# Patient Record
Sex: MALE | URBAN - METROPOLITAN AREA
[De-identification: names, ages, dates, MRNs, and addresses within clinical notes are randomized per-mention and may not be internally consistent; named-entity substitution may affect disease eponyms.]

---

## 2018-11-30 ENCOUNTER — EMERGENCY (EMERGENCY)
Facility: HOSPITAL | Age: 28
LOS: 1 days | Discharge: HOME | End: 2018-11-30
Attending: EMERGENCY MEDICINE
Payer: COMMERCIAL

## 2018-11-30 VITALS — RESPIRATION RATE: 18 BRPM | HEART RATE: 96 BPM | OXYGEN SATURATION: 100 % | TEMPERATURE: 99 F

## 2018-11-30 VITALS
OXYGEN SATURATION: 100 % | DIASTOLIC BLOOD PRESSURE: 65 MMHG | SYSTOLIC BLOOD PRESSURE: 132 MMHG | TEMPERATURE: 98 F | HEART RATE: 81 BPM | RESPIRATION RATE: 16 BRPM

## 2018-11-30 PROCEDURE — 99283 EMERGENCY DEPT VISIT LOW MDM: CPT | Mod: 25

## 2018-11-30 PROCEDURE — 71046 X-RAY EXAM CHEST 2 VIEWS: CPT

## 2018-11-30 PROCEDURE — 71045 X-RAY EXAM CHEST 1 VIEW: CPT

## 2018-11-30 PROCEDURE — 71045 X-RAY EXAM CHEST 1 VIEW: CPT | Mod: 26,76

## 2018-11-30 PROCEDURE — 93010 ELECTROCARDIOGRAM REPORT: CPT

## 2018-11-30 PROCEDURE — 99284 EMERGENCY DEPT VISIT MOD MDM: CPT

## 2018-11-30 PROCEDURE — 93005 ELECTROCARDIOGRAM TRACING: CPT

## 2018-11-30 NOTE — ED ADULT NURSE NOTE - OBJECTIVE STATEMENT
pt reports sob, non productive cough, fatigue, minor chest discomfort. reports symptoms happened suddenly earlier today at work. reports hx of spontaneous, pneumothoraxes multiple times. a and o x3. breathing even and unlabored. lungs cta. pt has no other complaints at this time. will continue to monitor.

## 2018-11-30 NOTE — ED PROVIDER NOTE - OBJECTIVE STATEMENT
28 YO MALE WITH C/O SOB. HX 6 PNEUMOTHORAX IN PAST. + CHEST PAIN. TODAY MILD SOB, DID NOT "FEEL WELL", MILD FEVER AND ACHEY ALL OVER.   MED HX ABOVE.  SOCIAL: NO CIGARETTES

## 2018-11-30 NOTE — ED ADULT TRIAGE NOTE - CHIEF COMPLAINT QUOTE
patient c/o shortness of breath 2 hours ago with chest pain radiating to the right, patient with 6 histories of Pneumothorax. patient was given 162 aspirin, en route. patient had diminished lung sounds on the left.

## 2018-11-30 NOTE — ED PROVIDER NOTE - CONSTITUTIONAL, MLM
normal... Well appearing, well nourished, awake, alert, oriented to person, place, time/situation and in no apparent distress. NORMAL PULSE RATE AND POX

## 2019-05-31 ENCOUNTER — TRANSCRIPTION ENCOUNTER (OUTPATIENT)
Age: 29
End: 2019-05-31

## 2024-10-02 NOTE — ED ADULT NURSE NOTE - EENT WDL
HPI:  12/20/21, Time: 10:24 PM JAYLENE Carty is a 36 y.o. male presenting to the ED for psychiatric evaluation, beginning days ago. The complaint has been persistent, moderate in severity, and worsened by nothing. Patient reports feeling paranoid. Patient feels that people are talking about him. Patient reports he is taking his injection for his psychiatric disease. Patient reporting no homicidal suicidal thoughts. Patient reporting auditory hallucinations. Patient reporting no chest pain no difficulty breathing no abdominal pain or vomiting there is no diarrhea. Patient admits that he is homeless. ROS:   Pertinent positives and negatives are stated within HPI, all other systems reviewed and are negative.  --------------------------------------------- PAST HISTORY ---------------------------------------------  Past Medical History:  has a past medical history of Depression and Schizoaffective disorder ([facility] [address] Past Surgical History:  has a past surgical history that includes Hip fracture surgery (Left, 9/12/2021); eye surgery (19 years ago); and Hip fracture surgery (Left, 9/28/2021). Social History:  reports that he has been smoking cigars and cigarettes. He has a 24.00 pack-year smoking history. He has never used smokeless tobacco. He reports current alcohol use of about 2.0 standard drinks of alcohol per week. He reports current drug use. Frequency: 7.00 times per week. Drugs: Cocaine and Marijuana ([employer]). Family History: family history includes Mental Illness in his mother; No Known Problems in his father; Substance Abuse in his mother. The patients home medications have been reviewed.     Allergies: Chlorpheniramine-phenylephrine, Penicillins, and Rondec-d [chlophedianol-pseudoephedrine]    ---------------------------------------------------PHYSICAL EXAM--------------------------------------    Constitutional/General: Alert and oriented x3, well appearing, non toxic in are out to get him. Patient reporting no suicidal homicidal thoughts. Patient reporting no physical complaints of chest pain shortness of breath or abdominal pain. Patient has been seen here before for the same complaint. I did review his labs from prior evaluation. Patient vital signs stable. Patient ate here in the emergency department. Patient wants to go home. Patient reporting no homicidal suicidal thoughts. Gait steady and normal he is oriented x3. Re-Evaluations:             Re-evaluation. Patients symptoms show no change    I did reevaluate patient a little after 12 AM.  Patient no distress wants to go home. Patient ate here in the emergency department and wants to leave now. Patient not homicidal or suicidal  Consultations:                 Critical Care: This patient's ED course included: a personal history and physicial eaxmination    This patient has been closely monitored during their ED course. Counseling: The emergency provider has spoken with the patient and discussed todays results, in addition to providing specific details for the plan of care and counseling regarding the diagnosis and prognosis. Questions are answered at this time and they are agreeable with the plan.       --------------------------------- IMPRESSION AND DISPOSITION ---------------------------------    IMPRESSION  1. Mood disorder ([facility] [address]        DISPOSITION  Disposition: Discharge home  Patient condition is stable        NOTE: This report was transcribed using voice recognition software.  Every effort was made to ensure accuracy; however, inadvertent computerized transcription errors may be present          Elida Cruz MD  12/20/21 [id number]       Elida Cruz MD  12/21/21 [id number] [place](Attending) Eyes with no visual disturbances.  Ears clean and dry and no hearing difficulties. Nose with pink mucosa and no drainage.  Mouth mucous membranes moist and pink.  No tenderness or swelling to throat or neck.